# Patient Record
Sex: MALE | Race: BLACK OR AFRICAN AMERICAN
[De-identification: names, ages, dates, MRNs, and addresses within clinical notes are randomized per-mention and may not be internally consistent; named-entity substitution may affect disease eponyms.]

---

## 2023-01-02 ENCOUNTER — HOSPITAL ENCOUNTER (EMERGENCY)
Dept: HOSPITAL 97 - ER | Age: 33
Discharge: HOME | End: 2023-01-02
Payer: COMMERCIAL

## 2023-01-02 VITALS — OXYGEN SATURATION: 100 % | DIASTOLIC BLOOD PRESSURE: 92 MMHG | TEMPERATURE: 97.3 F | SYSTOLIC BLOOD PRESSURE: 110 MMHG

## 2023-01-02 DIAGNOSIS — G47.00: Primary | ICD-10-CM

## 2023-01-02 PROCEDURE — 99281 EMR DPT VST MAYX REQ PHY/QHP: CPT

## 2023-01-02 NOTE — EDPHYS
Physician Documentation                                                                           

 [facility]                                                                 

Name: Steve Ku II                                                                             

Age: 32 yrs                                                                                       

Sex: Male                                                                                         

: 1990                                                                                   

MRN: [id number]                                                                                   

Arrival Date: 2023                                                                          

Time: 17:22                                                                                       

Account#: [id number]                                                                            

Bed IW4                                                                                           

Private MD:                                                                                       

ED Physician Bernarda Larson                                                                    

HPI:                                                                                              

                                                                                             

17:45 This 32 yrs old Black Male presents to ER via Ambulatory with complaints of             jh7 

      insomnia-needs work note.                                                                   

17:45 Patient reports that he had fatigue from difficulty sleeping recently. Stated that he   jh7 

      slept well last night and just needs a work note to return. No complaints at this time..    

                                                                                                  

Historical:                                                                                       

- Allergies:                                                                                      

17:48 No Known Allergies;                                                                     ss  

                                                                                                  

                                                                                                  

                                                                                                  

ROS:                                                                                              

17:45 Constitutional: Negative for fever, chills, and weight loss, Cardiovascular: Negative   jh7 

      for chest pain, palpitations, and edema, Respiratory: Negative for shortness of breath,     

      cough, wheezing, and pleuritic chest pain, Abdomen/GI: Negative for abdominal pain,         

      nausea, vomiting, diarrhea, and constipation, MS/Extremity: Negative for injury and         

      deformity, Skin: Negative for injury, rash, and discoloration, Neuro: Negative for          

      headache, weakness, numbness, tingling, and seizure.                                        

17:45 All other systems are negative.                                                             

                                                                                                  

Exam:                                                                                             

17:45 Constitutional:  This is a well developed, well nourished patient who is awake, alert,  jh7 

      and in no acute distress. Cardiovascular:  Regular rate and rhythm with a normal S1 and     

      S2.  No gallops, murmurs, or rubs.  Normal PMI, no JVD.  No pulse deficits.                 

      Respiratory:  Lungs have equal breath sounds bilaterally, clear to auscultation and         

      percussion.  No rales, rhonchi or wheezes noted.  No increased work of breathing, no        

      retractions or nasal flaring. Abdomen/GI:  Soft, non-tender, with normal bowel sounds.      

      No distension or tympany.  No guarding or rebound.  No evidence of tenderness               

      throughout. Skin:  Warm, dry with normal turgor.  Normal color with no rashes, no           

      lesions, and no evidence of cellulitis. MS/ Extremity:  Pulses equal, no cyanosis.          

      Neurovascular intact.  Full, normal range of motion. Neuro:  Awake and alert, GCS 15,       

      oriented to person, place, time, and situation.  Normal gait.                               

                                                                                                  

Vital Signs:                                                                                      

17:45  / 92; Pulse 78; Resp 20; Temp 97.3(TE); Pulse Ox 100% on R/A; Pain 0/10;         ss  

                                                                                                  

MDM:                                                                                              

17:22 Patient medically screened.                                                             [facility] 

17:30 Differential Diagnosis Normal exam. Data reviewed: vital signs, nurses notes. Data      [facility] 

      interpreted: Pulse oximetry: is 100 %. Interpretation: normal. Counseling: I had a          

      detailed discussion with the patient and/or guardian regarding: the historical points,      

      exam findings, and any diagnostic results supporting the discharge/admit diagnosis, to      

      return to the emergency department if symptoms worsen or persist or if there are any        

      questions or concerns that arise at home.                                                   

                                                                                                  

Administered Medications:                                                                         

No medications were administered                                                                  

                                                                                                  

                                                                                                  

Disposition Summary:                                                                              

23 17:49                                                                                    

Discharge Ordered                                                                                 

      Location: Home                                                                          [facility] 

      Problem: new                                                                            [facility] 

      Symptoms: are resolved                                                                  [facility] 

      Condition: Stable                                                                       [facility] 

      Diagnosis                                                                                   

        - Insomnia                                                                            [facility] 

      Followup:                                                                               [facility] 

        - With: Private Physician                                                                  

        - When: 2 - 3 days                                                                         

        - Reason: Recheck today's complaints                                                       

      Discharge Instructions:                                                                     

        - Discharge Summary Sheet                                                             [facility] 

        - Insomnia                                                                            [facility] 

      Forms:                                                                                      

        - Medication Reconciliation Form                                                      [facility] 

        - Thank You Letter                                                                    [facility] 

        - Work release form                                                                   [facility] 

Addendum:                                                                                         

2023                                                                                        

     22:41 STAFF ATTESTATION STATEMENT: I was immediately available onsite in the emergency        s
d2

           department for consultation in the care of this patient. I did not see or examine this 

           patient. Bernarda Larson MD.                                                       

                                                                                                  

Signatures:                                                                                       

Marisa Steinberg, DIXIE                      RN   Clarisse Telles, LEBRONP                   [place] [address]                                                  

Bernarda Larson MD MD   sd2                                                  

                                                                                                  

**************************************************************************************************

## 2023-01-02 NOTE — ER
Nurse's Notes                                                                                     

 [facility]                                                                 

Name: Steve Ku II                                                                             

Age: 32 yrs                                                                                       

Sex: Male                                                                                         

: 1990                                                                                   

MRN: [id number]                                                                                   

Arrival Date: 2023                                                                          

Time: 17:22                                                                                       

Account#: [id number]                                                                            

Bed IW4                                                                                           

Private MD:                                                                                       

Diagnosis: Insomnia                                                                               

                                                                                                  

Presentation:                                                                                     

                                                                                             

17:45 Chief complaint: Patient states: Needs a work note because of sleeping issues. Pt       ss  

      states after he got some rest earlier he feels better. Coronavirus screen: Client           

      denies travel out of the [place] in the last 14 days. Ebola Screen: Patient denies             

      exposure to infectious person. Patient denies travel to an Ebola-affected area in the       

      21 days before illness onset. Initial Sepsis Screen: Does the patient meet any 2            

      criteria? No. Patient's initial sepsis screen is negative. Does the patient have a          

      suspected source of infection? No. Patient's initial sepsis screen is negative. Risk        

      Assessment: Do you want to hurt yourself or someone else? Patient reports no desire to      

      harm self or others. Onset of symptoms is unknown.                                          

17:45 Method Of Arrival: Ambulatory                                                           ss  

17:45 Acuity: BROCK 5                                                                           ss  

                                                                                                  

Historical:                                                                                       

- Allergies:                                                                                      

17:48 No Known Allergies;                                                                     ss  

                                                                                                  

                                                                                                  

                                                                                                  

Assessment:                                                                                       

17:55 General: Appears in no apparent distress. comfortable, Behavior is calm, cooperative.   ss  

      Pain: Denies pain. Neuro: Level of Consciousness is awake, alert, obeys commands,           

      Oriented to person, place, time, situation. Cardiovascular: Capillary refill < 3            

      seconds is brisk in bilateral fingers. Respiratory: Airway is patent Respiratory effort     

      is even, unlabored, Respiratory pattern is regular, symmetrical. Derm: Skin is intact,      

      is healthy with good turgor, Skin is pink, warm \T\ dry. normal. Musculoskeletal: Range     

      of motion: intact in all extremities.                                                       

                                                                                                  

Vital Signs:                                                                                      

17:45  / 92; Pulse 78; Resp 20; Temp 97.3(TE); Pulse Ox 100% on R/A; Pain 0/10;         ss  

                                                                                                  

ED Course:                                                                                        

17:22 Patient arrived in ED.                                                                  am2 

17:22 [facility] is [facility]                                                          7 

17:22 Bernarda Larson MD is Attending Physician.                                           [facility] 

17:48 Triage completed.                                                                       ss  

17:48 Arm band placed on right wrist.                                                         ss  

17:56 Marisa Steinberg, RN is Primary Nurse.                                                      

17:56 No provider procedures requiring assistance completed. Patient did not have IV access   ss  

      during this emergency room visit.                                                           

                                                                                                  

Administered Medications:                                                                         

No medications were administered                                                                  

                                                                                                  

                                                                                                  

Outcome:                                                                                          

17:49 Discharge ordered by MD.                                                                heide 

17:56 Discharged to home ambulatory.                                                            

17:56 Condition: good                                                                             

17:56 Condition: good                                                                             

17:56 Discharge instructions given to patient, Instructed on discharge instructions, follow       

      up and referral plans. Demonstrated understanding of instructions, follow-up care.          

17:57 Patient left the ED.                                                                      

                                                                                                  

Signatures:                                                                                       

Marisa Steinberg, RN                      RN                                                      

Yaritza Cornelius am2                                                  

Clarisse Jose, FNP                   FNP  [facility]                                                  

                                                                                                  

**************************************************************************************************

## 2023-03-22 ENCOUNTER — HOSPITAL ENCOUNTER (EMERGENCY)
Dept: HOSPITAL 97 - ER | Age: 33
Discharge: HOME | End: 2023-03-22
Payer: COMMERCIAL

## 2023-03-22 DIAGNOSIS — M25.572: Primary | ICD-10-CM

## 2023-03-22 DIAGNOSIS — F17.290: ICD-10-CM
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
## 2023-03-22 DIAGNOSIS — M54.9: ICD-10-CM

## 2023-03-22 DIAGNOSIS — R07.9: ICD-10-CM

## 2023-03-22 LAB
BUN BLD-MCNC: 13 MG/DL (ref 7–18)
GLUCOSE SERPLBLD-MCNC: 115 MG/DL (ref 74–106)
HCT VFR BLD CALC: 41.9 % (ref 39.6–49)
LYMPHOCYTES # SPEC AUTO: 1.4 K/UL (ref 0.7–4.9)
MCV RBC: 82.5 FL (ref 80–100)
PMV BLD: 7.6 FL (ref 7.6–11.3)
POTASSIUM SERPL-SCNC: 3.8 MEQ/L (ref 3.5–5.1)
RBC # BLD: 5.08 M/UL (ref 4.33–5.43)

## 2023-03-22 PROCEDURE — 86901 BLOOD TYPING SEROLOGIC RH(D): CPT

## 2023-03-22 PROCEDURE — 71045 X-RAY EXAM CHEST 1 VIEW: CPT

## 2023-03-22 PROCEDURE — 73630 X-RAY EXAM OF FOOT: CPT

## 2023-03-22 PROCEDURE — 72170 X-RAY EXAM OF PELVIS: CPT

## 2023-03-22 PROCEDURE — 74177 CT ABD & PELVIS W/CONTRAST: CPT

## 2023-03-22 PROCEDURE — 36415 COLL VENOUS BLD VENIPUNCTURE: CPT

## 2023-03-22 PROCEDURE — 85025 COMPLETE CBC W/AUTO DIFF WBC: CPT

## 2023-03-22 PROCEDURE — 70450 CT HEAD/BRAIN W/O DYE: CPT

## 2023-03-22 PROCEDURE — 71260 CT THORAX DX C+: CPT

## 2023-03-22 PROCEDURE — 73590 X-RAY EXAM OF LOWER LEG: CPT

## 2023-03-22 PROCEDURE — 86850 RBC ANTIBODY SCREEN: CPT

## 2023-03-22 PROCEDURE — 80048 BASIC METABOLIC PNL TOTAL CA: CPT

## 2023-03-22 PROCEDURE — 86900 BLOOD TYPING SEROLOGIC ABO: CPT

## 2023-03-22 PROCEDURE — 72125 CT NECK SPINE W/O DYE: CPT
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
## 2023-03-23 VITALS — TEMPERATURE: 99.2 F

## 2023-03-23 VITALS — DIASTOLIC BLOOD PRESSURE: 94 MMHG | OXYGEN SATURATION: 97 % | SYSTOLIC BLOOD PRESSURE: 140 MMHG

## 2024-08-20 NOTE — ER
Nurse's Notes                                                                                     

 [facility]\""                                                                 

Name: Steve Ku II                                                                             

Age: 34 yrs                                                                                       

Sex: Male                                                                                         

: 1990                                                                                   

MRN: [id number]                                                                                   

Arrival Date: 2024                                                                          

Time: 08:02                                                                                       

Account#: [id number]                                                                            

Bed IW2                                                                                           

Private MD:                                                                                       

Diagnosis: Dental caries, unspecified                                                             

                                                                                                  

Presentation:                                                                                     

                                                                                             

08:31 Chief complaint: Patient states: L lower jaw tooth pain for 3 days. Coronavirus screen: ll1 

      Client denies travel out of the [place] in the last 14 days. At this time, the client does     

      not indicate any symptoms associated with coronavirus-19. Ebola Screen: Patient denies      

      travel to an Ebola-affected area in the 21 days before illness onset. Initial Sepsis        

      Screen: Does the patient meet any 2 criteria? No. Patient's initial sepsis screen is        

      negative. Does the patient have a suspected source of infection? No. Patient's initial      

      sepsis screen is negative. Risk Assessment: Do you want to hurt yourself or someone         

      else? Patient reports no desire to harm self or others. Onset of symptoms was 2024.                                                                                   

08:31 Method Of Arrival: Ambulatory                                                           ll1 

08:31 Acuity: BROCK 5                                                                           ll1 

                                                                                                  

Triage Assessment:                                                                                

08:31 General: Appears uncomfortable, Behavior is calm, cooperative, appropriate for age.     ll1 

      Pain: Complains of pain in L lower jaw.                                                     

08:31 EENT: Reports pain in left jaw.                                                         ll1 

                                                                                                  

Historical:                                                                                       

- Allergies:                                                                                      

08:31 No Known Allergies;                                                                     ll1 

- PSHx:                                                                                           

08:31 None;                                                                                   ll1 

                                                                                                  

- Immunization history:: Adult Immunizations up to date.                                          

- Infectious Disease History:: Denies.                                                            

- Social history:: Smoking status: Reported history of juuling and/or vaping.                     

- Family history:: not pertinent.                                                                 

- Hospitalizations: : No recent hospitalization is reported.                                      

                                                                                                  

                                                                                                  

Screenin:47 [facility] ED Fall Risk Assessment (Adult) History of falling in the last 3 months,       ll1 

      including since admission No falls in past 3 months (0 pts) Confusion or Disorientation     

      No (0 pts) Intoxicated or Sedated No (0 pts) Impaired Gait No (0 pts) Mobility Assist       

      Device Used No (0 pt) Altered Elimination No (0 pt) Score/Fall Risk Level 0 - 2 = Low       

      Risk Maintained a safe environment, Hourly rounding (assess needs \T\ fall precautionary    

      measures) done. Abuse screen: Denies threats or abuse. Nutritional screening: No            

      deficits noted. Tuberculosis screening: No symptoms or risk factors identified.             

                                                                                                  

Assessment:                                                                                       

08:47 Reassessment: No changes from previously documented assessment. Patient and/or family   ll1 

      updated on plan of care and expected duration. Pain level reassessed. Patient is alert,     

      oriented x 3, equal unlabored respirations, skin warm/dry/pink.                             

                                                                                                  

Vital Signs:                                                                                      

08:31  / 93; Pulse 53; Resp 18; Temp 98.7; Pulse Ox 99% ; Weight 77.11 kg; Height 5 ft. ll1 

      7 in. ; Pain 6/10;                                                                          

08:31  / 73; Pulse 53; Resp 17; Temp 98.7; Pulse Ox 99% ;                               ll1 

08:31 Body Mass Index 26.63 (77.11 kg, 170.18 cm)                                             ll1 

08:31 Pain Scale: Adult                                                                       ll1 

                                                                                                  

ED Course:                                                                                        

08:06 Patient arrived in ED.                                                                  ra3 

08:06 Dallas Crespo MD is Attending Physician.                                                rn  

08:30 Arm band placed on.                                                                     ll1 

08:32 Triage completed.                                                                       ll1 

08:47 Patient has correct armband on for positive identification. Provided Education on:      ll1 

      finish all prescribed antibiotics.                                                          

08:47 No provider procedures requiring assistance completed. Patient did not have IV access   ll1 

      during this emergency room visit.                                                           

                                                                                                  

Administered Medications:                                                                         

No medications were administered                                                                  

                                                                                                  

                                                                                                  

Medication:                                                                                       

09:51 VIS not applicable for this client.                                                     ll1 

                                                                                                  

Outcome:                                                                                          

08:39 Discharge ordered by MD.                                                                rn  

08:47 Patient left the ED.                                                                    ll1 

08:47 Discharged to home ambulatory,                                                          ll1 

08:47 Condition: stable                                                                           

08:47 Discharge instructions given to patient, Instructed on discharge instructions, follow       

      up and referral plans. medication usage, Demonstrated understanding of instructions,        

      follow-up care, medications, Prescriptions given X 2,                                       

                                                                                                  

Signatures:                                                                                       

Dallas Crespo MD MD rn Lewis, Lynsay, RN                       RN   [id number]                                                  

Rahel Cameron                                   [id number]                                                  

                                                                                                  

**************************************************************************************************

## 2024-08-20 NOTE — XMS REPORT
Continuity of Care Document



                           Created on: 2024





ELLIE BARCENAS

External Reference #: [id number]

: 1990

Sex: Male



Demographics





                                        Address             [address]

[place]  [address]

 

                                        Home Phone          [phone]

 

                                        Work Phone          [phone]

 

                                        Mobile Phone        [phone] )

 

                                        Email Address       [email or web address]

[email or web address]

 

                                        Preferred Language  Unknown

 

                                        Marital Status      Unknown

 

                                        Moravian Affiliation Unknown

 

                                        Race                Unknown

 

                                        Ethnic Group        Unknown





Author





                                        Name                Unknown

 

                                        Address             [address] [place] [address]

[address]

[employer]

 

                                        Address             [address] [place] [address] [address]

[place] [address]

 

                                        Phone               [phone]





Care Team Providers





                                Care Team Member Name Role            Phone

 

                                Unavailable     Unavailable     Unavailable







Encounters





                                                    Start 

Date/Time                               End 

Date/Time                               Encounter 

Type                                    Admission 

Type                                    Attending 

Clinicians                              Care 

Facility                                Care 

Department                              Encounter 

ID                                      Source

 

                                                    2024 

09:15:30                                2024 

09:15:30   Outpatient                       [facility]        [id number]

[id number]                                    [address]

## 2024-08-20 NOTE — EDPHYS
Physician Documentation                                                                           

 [facility]                                                                 

Name: Steve Ku II                                                                             

Age: 34 yrs                                                                                       

Sex: Male                                                                                         

: 1990                                                                                   

MRN: [id number]                                                                                   

Arrival Date: 2024                                                                          

Time: 08:02                                                                                       

Account#: [id number]                                                                            

Bed IW2                                                                                           

Private MD:                                                                                       

ED Physician Dallas Crespo                                                                         

HPI:                                                                                              

                                                                                             

08:37 This 34 yrs old Black Male presents to ER via Ambulatory with complaints of Toothache.  rn  

08:37 The patient presents with pain, swelling. Onset: The symptoms/episode began/occurred 2  rn  

      day(s) ago. Duration: The symptoms are continuous. Modifying factors: The symptoms are      

      alleviated by over the counter medications, the symptoms are aggravated by chewing.         

      Severity of symptoms: At their worst the symptoms were moderate, in the emergency           

      department the symptoms have improved. The patient has experienced a previous episode.      

      Patient reports left lower dental pain, has had before, needs dental procedure, woke up     

      with pain and swelling to the left jaw. No fever or chills.                                 

                                                                                                  

Historical:                                                                                       

- Allergies:                                                                                      

08:31 No Known Allergies;                                                                     ll1 

- PSHx:                                                                                           

08:31 None;                                                                                   ll1 

                                                                                                  

- Immunization history:: Adult Immunizations up to date.                                          

- Infectious Disease History:: Denies.                                                            

- Social history:: Smoking status: Reported history of juuling and/or vaping.                     

- Family history:: not pertinent.                                                                 

- Hospitalizations: : No recent hospitalization is reported.                                      

                                                                                                  

                                                                                                  

ROS:                                                                                              

08:37 Constitutional: Negative for fever, chills, and weight loss, ENT: Positive for dental   rn  

      pain Respiratory: Negative for shortness of breath, cough, wheezing, and pleuritic          

      chest pain,                                                                                 

                                                                                                  

Exam:                                                                                             

08:37 Constitutional:  This is a well developed, well nourished patient who is awake, alert,  rn  

      and in no acute distress. ENT:  Deep cavity left lower tooth, no evidence of abscess or     

      buccal space swelling                                                                       

                                                                                                  

Vital Signs:                                                                                      

08:31  / 93; Pulse 53; Resp 18; Temp 98.7; Pulse Ox 99% ; Weight 77.11 kg; Height 5 ft. ll1 

      7 in. ; Pain 6/10;                                                                          

08:31  / 73; Pulse 53; Resp 17; Temp 98.7; Pulse Ox 99% ;                               ll1 

08:31 Body Mass Index 26.63 (77.11 kg, 170.18 cm)                                             ll1 

08:31 Pain Scale: Adult                                                                       ll1 

                                                                                                  

MDM:                                                                                              

08:06 Patient medically screened.                                                             rn  

08:37 Differential diagnosis: dental caries, dental abscess. Data reviewed: vital signs,      rn  

      nurses notes, and as a result, I will discharge patient. Counseling: I had a detailed       

      discussion with the patient and/or guardian regarding the historical points, exam           

      findings, and any diagnostic results supporting the discharge/admit diagnosis, the need     

      for outpatient follow up, to return to the emergency department if symptoms worsen or       

      persist or if there are any questions or concerns that arise at home. Special               

      discussion: I discussed with the patient/guardian in detail that at this point there is     

      no indication for admission to the hospital. It is understood, however, that if the         

      symptoms persist or worsen the patient needs to return immediately for re-evaluation.       

      Based on the history and exam findings, there is no indication for further emergent         

      testing or inpatient evaluation. I discussed with the patient/guardian the need to see      

      a dentist for further evaluation of the symptoms.                                           

                                                                                                  

Administered Medications:                                                                         

No medications were administered                                                                  

                                                                                                  

                                                                                                  

Disposition Summary:                                                                              

24 08:39                                                                                    

Discharge Ordered                                                                                 

 Notes:       Location: Home                                                                        
  rn

      Problem: an acute exacerbation                                                          rn  

      Symptoms: have improved                                                                 rn  

      Condition: Stable                                                                       rn  

      Diagnosis                                                                                   

        - Dental caries, unspecified                                                          rn  

      Followup:                                                                               rn  

        - With: Private Physician                                                                  

        - When: As needed                                                                          

        - Reason: Recheck today's complaints, Re-evaluation by your physician                      

      Discharge Instructions:                                                                     

        - Discharge Summary Sheet                                                             rn  

        - Dental Caries, Adult                                                                rn  

        - Dental Pain                                                                         rn  

      Forms:                                                                                      

        - Medication Reconciliation Form                                                      rn  

        - Antibiotic Education                                                                rn  

        - Prescription Opioid Use                                                             rn  

        - Patient Portal Instructions                                                         rn  

        - Leadership Thank You Letter                                                         rn  

        - Work release form                                                                   ll1 

      Prescriptions:                                                                              

        - Amoxicillin 875 mg Oral Tablet                                                           

            - take 1 tablet ORAL route every 12 hours for 10 days; 20 tablet; Refills: 0,     rn  

      Product Selection Permitted                                                                 

        - Ibuprofen 800 mg Oral Tablet                                                             

            - take 1 tablet ORAL route every 12 hours As needed take with food; 20 tablet;    rn  

      Refills: 0, Product Selection Permitted                                                     

Signatures:                                                                                       

Dallas Crespo MD MD rn Lewis, Lynsay, RN RN   1                                                  

                                                                                                  

**************************************************************************************************

## 2025-01-21 NOTE — XMS REPORT
Continuity of Care Document



                          Created on: 2025





ELLIE BARCENAS

External Reference #: [id number]

: 1990

Sex: Male



Demographics





                                        Address             [address]

[place]  [address]

 

                                        Home Phone          [phone]

 

                                        Work Phone          [phone]

 

                                        Mobile Phone        [phone] )

 

                                        Email Address       [email or web address]

[email or web address]

 

                                        Preferred Language  Unknown

 

                                        Marital Status      Unknown

 

                                        Jainism Affiliation Unknown

 

                                        Race                Unknown

 

                                        Ethnic Group        Unknown





Author





                                        Name                Unknown

 

                                        Address             [address] [place] [address]

[address]

[employer]

 

                                        Address             [address] [place] [address] [address]

[place]  [address]

 

                                        Phone               [phone]





Care Team Providers





                                Care Team Member Name Role            Phone

 

                                Unavailable     Unavailable     Unavailable







Encounters





                                                    Start 

Date/Time                               End 

Date/Time                               Encounter 

Type                                    Admission 

Type                                    Attending 

Clinicians                              Care 

Facility                                Care 

Department                              Encounter 

ID                                      Source

 

                                                    2024 

09:15:30                                2024 

09:15:30   Outpatient                       [facility]        [id number]

[id number]                                    Satin[address] Woodard

## 2025-01-21 NOTE — EDPHYS
Physician Documentation                                                                           

 [facility]                                                                 

Name: Steve Ku II                                                                             

Age: 34 yrs                                                                                       

Sex: Male                                                                                         

: 1990                                                                                   

MRN: [id number]                                                                                   

Arrival Date: 2025                                                                          

Time: 13:27                                                                                       

Account#: [id number]                                                                            

Bed 12                                                                                            

Private MD:                                                                                       

ED Physician Rocky Beach                                                                      

HPI:                                                                                              

                                                                                             

13:41 This 34 yrs old Black Male presents to ER via Ambulatory with complaints of Cough -     dr5 

      x1wk.                                                                                       

13:41 The patient or guardian reports cough, described as mild. Patient is a 34-year-old male dr5 

      with no past history coming in with cough it has been going on for 6 to 7 days. Patient     

      reports having fever the first couple days that resolved. Patient has been taking           

      NyQuil to help with symptoms with mild relief. Patient denies fever today, chest pain,      

      shortness of breath. Patient reports that he is actually feeling better today..             

                                                                                                  

Historical:                                                                                       

- Allergies:                                                                                      

13:40 No Known Allergies;                                                                     cm10

- Home Meds:                                                                                      

13:40 None [Active];                                                                          cm10

- PMHx:                                                                                           

13:40 None;                                                                                   cm10

- PSHx:                                                                                           

13:40 None;                                                                                   cm10

                                                                                                  

- Immunization history:: Adult Immunizations up to date.                                          

- Infectious Disease History:: Denies.                                                            

- Social history:: Smoking status: Reported history of juuling and/or vaping.                     

                                                                                                  

                                                                                                  

ROS:                                                                                              

13:41 Constitutional: as per hpi                                                              dr5 

                                                                                                  

Exam:                                                                                             

13:41 Constitutional:  This is a well developed, well nourished patient who is awake, alert,  dr5 

      and in no acute distress. Head/Face:  Normocephalic, atraumatic. Eyes:  Pupils equal        

      round and reactive to light, extra-ocular motions intact.  Lids and lashes normal.          

      Conjunctiva and sclera are non-icteric and not injected.  Cornea within normal limits.      

      Periorbital areas with no swelling, redness, or edema. Neck:  Trachea midline, no           

      thyromegaly or masses palpated, and no cervical lymphadenopathy.  Supple, full range of     

      motion without nuchal rigidity, or vertebral point tenderness.  No Meningismus.             

      Chest/axilla:  Normal chest wall appearance and motion.  Nontender with no deformity.       

      No lesions are appreciated. Cardiovascular:  Regular rate and rhythm with a normal S1       

      and S2. Normal PMI, no JVD. No pulse deficits. Back:  No spinal tenderness.  No             

      costovertebral tenderness.  Full range of motion. Skin:  Warm, dry with normal turgor.      

      Normal color with no rashes, no lesions, and no evidence of cellulitis. MS/ Extremity:      

      Pulses equal, no cyanosis.  Neurovascular intact.  Full, normal range of motion. Neuro:     

       Awake and alert, GCS 15, oriented to person, place, time, and situation.  Cranial          

      nerves II-XII grossly intact.  Motor strength 5/5 in all extremities.  Sensory grossly      

      intact.  Cerebellar exam normal.  Normal gait.                                              

13:41 Respiratory: the patient does not display signs of respiratory distress,  Respirations:     

      normal, Breath sounds: wheezing: expiratory that is mild, is heard diffusely,               

      Respiratory rate:  20                                                                       

                                                                                                  

Vital Signs:                                                                                      

13:39  / 59; Pulse 75; Resp 18; Temp 98.2(O); Pulse Ox 100% on R/A; Weight 95.25 kg;    cm10

      Height 5 ft. 8 in. ; Pain 2/10;                                                             

13:39 Body Mass Index 31.93 (95.25 kg, 172.72 cm)                                             cm10

13:39 Pain Scale: Adult                                                                       cm10

                                                                                                  

MDM:                                                                                              

13:32 Medical Screening Exam initiated                                                        dr5 

13:41 Differential Diagnosis: Bronchitis Upper Respiratory Infection Pneumonia. Data          dr5 

      reviewed: vital signs, nurses notes. Care significantly affected by the following           

      Social Determinants of Health: Poor access to healthcare and/or lack of insurance, Poor     

      access to transportation, Problems related to employment. Counseling: I had a detailed      

      discussion with the patient and/or guardian regarding the historical points, exam           

      findings, and any diagnostic results supporting the discharge/admit diagnosis, the          

      presence of at least one elevated blood pressure reading (>120/80) during this              

      emergency department visit, the need for outpatient follow up, for definitive care, a       

      family practitioner, to return to the emergency department if symptoms worsen or            

      persist or if there are any questions or concerns that arise at home. ED course: Will       

      not screen for COVID or flu given patient's duration of symptoms and inability to not       

      treat it. Will get chest x-ray to rule out pneumonia..                                      

14:39 ED course: No pneumonia noted on x-ray. Will treat patient symptomatically with         dr5 

      albuterol inhaler, steroid Dosepak, and cough medication. Recommended alternating           

      Tylenol Motrin as needed for fever. Recommend increasing hydration..                        

                                                                                                  

                                                                                             

13:40 Order name: Chest Pa And Lat (2 Views) XRAY; Complete Time: 14:36                       dr5 

                                                                                                  

Administered Medications:                                                                         

No medications were administered                                                                  

                                                                                                  

                                                                                                  

Disposition:                                                                                      

                                                                                             

07:29 Co-signature as Attending Physician, Rocky Beach MD I agree with the assessment and  francisca 

      plan of care.                                                                               

                                                                                                  

Disposition Summary:                                                                              

25 14:37                                                                                    

Discharge Ordered                                                                                 

 Notes:       Location: Home                                                                        
  dr5

      Condition: Stable                                                                       dr5 

      Diagnosis                                                                                   

        - Acute upper respiratory infection, unspecified                                      dr5 

      Followup:                                                                               dr5 

        - With: Emergency Department                                                               

        - When: As needed                                                                          

        - Reason: Worsening of condition                                                           

      Followup:                                                                               dr5 

        - With: Private Physician                                                                  

        - When: 1 - 2 days                                                                         

        - Reason: Recheck today's complaints, Continuance of care, Re-evaluation by your           

      physician                                                                                   

      Discharge Instructions:                                                                     

        - Discharge Summary Sheet                                                             dr5 

        - Upper Respiratory Infection, Adult                                                  dr5 

      Forms:                                                                                      

        - Work release form                                                                   dr5 

        - Medication Reconciliation Form                                                      dr5 

        - Patient Portal Instructions                                                         dr5 

        - Leadership Thank You Letter                                                         dr5 

      Prescriptions:                                                                              

        - albuterol sulfate 90 mcg/actuation Inhalation HFA Aerosol Inhaler                        

            - inhale 1 puff INHALATION route every 4 hours As needed administer via           dr5 

      ventilator; 1 application; Refills: 0, Product Selection Permitted                          

        - Medrol (Antony) 4 mg Oral Tablets, Dose Pack                                                

            - take 1 tablet ORAL route as directed - follow package instructions; 1 packet;   dr5 

      Refills: 0, Product Selection Permitted                                                     

        - benzonatate 100 mg Oral capsule                                                          

            - take 1 capsule ORAL route 3 times per day; 30 capsule; Refills: 0, Product      dr5 

      Selection Permitted                                                                         

Signatures:                                                                                       

Dispatcher [employer]                           Rocky Chen MD MD cha Martinez, Clarissa, RN                  RN   cm10                                                 

Toro Fournier, FNP-C                   FNP-Cdr5                                                  

                                                                                                  

**************************************************************************************************

## 2025-01-21 NOTE — RAD REPORT
EXAM: Chest Pa And Lat (2 Views)



HISTORY: 34 years Male COUGH



COMPARISON: 3/22/2023



FINDINGS:

LUNGS/PLEURA: The lungs are clear. No pleural effusions or pneumothorax. No pulmonary edema. 

MEDIASTINUM: The mediastinal silhouette is within normal limits.

CARDIAC: The cardiac silhouette is within normal limits.

UPPER ABDOMEN: No significant abnormality.

BONES: No acute abnormality.

LINES/TUBES/OTHER: N/A



IMPRESSION:

No evidence of acute cardiopulmonary disease.



Reported By: Rony Aguilar 

Electronically Signed:  1/21/2025 2:21 PM

## 2025-01-21 NOTE — ER
Nurse's Notes                                                                                     

 [facility]                                                                 

Name: Steve Ku II                                                                             

Age: 34 yrs                                                                                       

Sex: Male                                                                                         

: 1990                                                                                   

MRN: [id number]                                                                                   

Arrival Date: 2025                                                                          

Time: 13:27                                                                                       

Account#: [id number]                                                                            

Bed 12                                                                                            

Private MD:                                                                                       

Diagnosis: Acute upper respiratory infection, unspecified                                         

                                                                                                  

Presentation:                                                                                     

                                                                                             

13:39 Chief complaint: Patient states: Cough X1 week. Pt states that he feels better but came cm10

      to the ER because his job said that he needed to come get checked. Coronavirus screen:      

      Client denies travel out of the [place] in the last 14 days. Ebola Screen: Patient denies      

      travel to an Ebola-affected area in the 21 days before illness onset. Initial Sepsis        

      Screen: Does the patient meet any 2 criteria? No. Patient's initial sepsis screen is        

      negative. Does the patient have a suspected source of infection? No. Patient's initial      

      sepsis screen is negative. Risk Assessment: Do you want to hurt yourself or someone         

      else? Patient reports no desire to harm self or others. Onset of symptoms was 2025.                                                                                   

13:39 Method Of Arrival: Ambulatory                                                           cm10

13:39 Acuity: BROCK 4                                                                           cm10

                                                                                                  

Triage Assessment:                                                                                

13:40 General: Appears in no apparent distress. comfortable, Behavior is calm, cooperative.   cm10

      Pain: Complains of pain in head Pain currently is 2 out of 10 on a pain scale. Neuro:       

      No deficits noted. Level of Consciousness is awake, alert, obeys commands, Oriented to      

      person, place, time, situation, Appropriate for age. Respiratory: No deficits noted.        

      Reports cough that is persistent Airway is patent Respiratory effort is even,               

      unlabored, Respiratory pattern is regular, symmetrical.                                     

                                                                                                  

Historical:                                                                                       

- Allergies:                                                                                      

13:40 No Known Allergies;                                                                     cm10

- Home Meds:                                                                                      

13:40 None [Active];                                                                          cm10

- PMHx:                                                                                           

13:40 None;                                                                                   cm10

- PSHx:                                                                                           

13:40 None;                                                                                   cm10

                                                                                                  

- Immunization history:: Adult Immunizations up to date.                                          

- Infectious Disease History:: Denies.                                                            

- Social history:: Smoking status: Reported history of juuling and/or vaping.                     

                                                                                                  

                                                                                                  

Screenin:41 [facility] ED Fall Risk Assessment (Adult) History of falling in the last 3 months,       cm10

      including since admission No falls in past 3 months (0 pts) Confusion or Disorientation     

      No (0 pts) Intoxicated or Sedated No (0 pts) Impaired Gait No (0 pts) Mobility Assist       

      Device Used No (0 pt) Altered Elimination No (0 pt) Score/Fall Risk Level 0 - 2 = Low       

      Risk Oriented to surroundings, Maintained a safe environment, Hourly rounding (assess       

      needs \T\ fall precautionary measures) done. Abuse screen: Denies threats or abuse.         

      Denies injuries from another. Nutritional screening: No deficits noted. Tuberculosis        

      screening: No symptoms or risk factors identified.                                          

                                                                                                  

Assessment:                                                                                       

14:41 General: Appears in no apparent distress. comfortable, Behavior is calm, cooperative.   ss  

      Neuro: Level of Consciousness is awake, alert, obeys commands.                              

                                                                                                  

Vital Signs:                                                                                      

13:39  / 59; Pulse 75; Resp 18; Temp 98.2(O); Pulse Ox 100% on R/A; Weight 95.25 kg;    cm10

      Height 5 ft. 8 in. ; Pain 2/10;                                                             

13:39 Body Mass Index 31.93 (95.25 kg, 172.72 cm)                                             cm10

13:39 Pain Scale: Adult                                                                       cm10

                                                                                                  

ED Course:                                                                                        

13:30 Patient arrived in ED.                                                                  ra3 

13:32 Toro Fournier FNP-C is PHCP.                                                          dr5 

13:32 Rocky Beach MD is Attending Physician.                                             dr5 

13:40 Triage completed.                                                                       cm10

13:40 Arm band placed on right wrist. Patient placed in an exam room, on a stretcher.         cm10

13:41 Patient has correct armband on for positive identification. Bed in low position. Call   cm10

      light in reach. Provided Education on: ER process and procedures. Cardiac monitoring        

      not applicable on this patient.                                                             

14:11 Chest Pa And Lat (2 Views) XRAY In Process Unspecified.                                 EDMS

14:37 Jina Link, RN is Primary Nurse.                                                cm10

14:37 No provider procedures requiring assistance completed. Patient did not have IV access   cm10

      during this emergency room visit.                                                           

                                                                                                  

Administered Medications:                                                                         

No medications were administered                                                                  

                                                                                                  

                                                                                                  

Medication:                                                                                       

13:41 VIS not applicable for this client.                                                     cm10

                                                                                                  

Outcome:                                                                                          

14:37 Discharge ordered by MD.                                                                dr5 

14:41 Discharged to home ambulatory,                                                            

14:41 Condition: good                                                                             

14:41 Discharge instructions given to patient, family, dc instructions given by VILMA Engel        

      Instructed on discharge instructions, follow up and referral plans. medication usage,       

      Demonstrated understanding of instructions, follow-up care, medications, Prescriptions      

      given X 3,                                                                                  

14:42 Patient left the ED.                                                                      

                                                                                                  

Signatures:                                                                                       

Dispatcher [employer]                           EDMS                                                 

Marisa Lea RN                   RN   ss                                                   

Jina Link RN                  RN   cm10                                                 

Rahel Cameron                                   ra3                                                  

Toro Fournier, FNP-C                   FNP-Cdr5                                                  

                                                                                                  

Corrections: (The following items were deleted from the chart)                                    

13:41 13:39  / 59; Pulse 75bpm; Resp 18bpm; Pulse Ox 100% RA; Temp 98.2F Oral; 95.25    cm10

      kg; Height 5 ft. 8 in.; BMI: 31.9; Pain 0/10, Adult; cm10                                   

                                                                                                  

**************************************************************************************************